# Patient Record
(demographics unavailable — no encounter records)

---

## 2025-06-02 NOTE — HISTORY OF PRESENT ILLNESS
[de-identified] : 46 yr old female  with history of VSG for  obesity in 2014 went from 225 to 165 lb and has   experienced  weight gain gradually. She  has a history of Hypertension   and anxiety. Denies GERD. She used ozempic  for 18 months  with  significant success.